# Patient Record
Sex: MALE | Race: WHITE
[De-identification: names, ages, dates, MRNs, and addresses within clinical notes are randomized per-mention and may not be internally consistent; named-entity substitution may affect disease eponyms.]

---

## 2019-02-15 ENCOUNTER — HOSPITAL ENCOUNTER (EMERGENCY)
Dept: HOSPITAL 25 - ED | Age: 19
Discharge: ANOTHER HOSPITAL | End: 2019-02-15
Payer: COMMERCIAL

## 2019-02-15 VITALS — DIASTOLIC BLOOD PRESSURE: 66 MMHG | SYSTOLIC BLOOD PRESSURE: 118 MMHG

## 2019-02-15 DIAGNOSIS — J45.990: Primary | ICD-10-CM

## 2019-02-15 LAB
ALBUMIN SERPL BCG-MCNC: 3.3 G/DL (ref 3.2–5.2)
ALBUMIN/GLOB SERPL: 0.7 {RATIO} (ref 1–3)
ALP SERPL-CCNC: 126 U/L (ref 34–104)
ALT SERPL W P-5'-P-CCNC: 271 U/L (ref 7–52)
ANION GAP SERPL CALC-SCNC: 9 MMOL/L (ref 2–11)
AST SERPL-CCNC: 120 U/L (ref 13–39)
BASOPHILS # BLD AUTO: 0.1 10^3/UL (ref 0–0.2)
BUN SERPL-MCNC: 11 MG/DL (ref 6–24)
BUN/CREAT SERPL: 17.5 (ref 8–20)
CALCIUM SERPL-MCNC: 8.6 MG/DL (ref 8.6–10.3)
CHLORIDE SERPL-SCNC: 96 MMOL/L (ref 101–111)
CK SERPL-CCNC: 28 U/L (ref 10–223)
EOSINOPHIL # BLD AUTO: 0.2 10^3/UL (ref 0–0.6)
FLUAV RNA SPEC QL NAA+PROBE: NEGATIVE
FLUBV RNA SPEC QL NAA+PROBE: NEGATIVE
GLOBULIN SER CALC-MCNC: 4.6 G/DL (ref 2–4)
GLUCOSE SERPL-MCNC: 115 MG/DL (ref 70–100)
HCO3 SERPL-SCNC: 27 MMOL/L (ref 22–32)
HCT VFR BLD AUTO: 37 % (ref 42–52)
HGB BLD-MCNC: 12.8 G/DL (ref 14–18)
INR PPP/BLD: 1.4 (ref 0.77–1.02)
LDH SERPL L TO P-CCNC: 277 U/L (ref 140–271)
LYMPHOCYTES # BLD AUTO: 1.3 10^3/UL (ref 1–4.8)
MCH RBC QN AUTO: 30 PG (ref 27–31)
MCHC RBC AUTO-ENTMCNC: 35 G/DL (ref 31–36)
MCV RBC AUTO: 85 FL (ref 80–94)
MONOCYTES # BLD AUTO: 1.4 10^3/UL (ref 0–0.8)
NEUTROPHILS # BLD AUTO: 9.9 10^3/UL (ref 1.5–7.7)
NRBC # BLD AUTO: 0 10^3/UL
NRBC BLD QL AUTO: 0
PLATELET # BLD AUTO: 329 10^3/UL (ref 150–450)
POTASSIUM SERPL-SCNC: 3.7 MMOL/L (ref 3.5–5)
PROT SERPL-MCNC: 7.9 G/DL (ref 6.4–8.9)
RBC # BLD AUTO: 4.34 10^6/UL (ref 4–5.4)
RBC UR QL AUTO: (no result)
SODIUM SERPL-SCNC: 132 MMOL/L (ref 135–145)
WBC # BLD AUTO: 12.8 10^3/UL (ref 3.5–10.8)
WBC UR QL AUTO: (no result)

## 2019-02-15 PROCEDURE — 76870 US EXAM SCROTUM: CPT

## 2019-02-15 PROCEDURE — 81003 URINALYSIS AUTO W/O SCOPE: CPT

## 2019-02-15 PROCEDURE — 36415 COLL VENOUS BLD VENIPUNCTURE: CPT

## 2019-02-15 PROCEDURE — 87086 URINE CULTURE/COLONY COUNT: CPT

## 2019-02-15 PROCEDURE — 81015 MICROSCOPIC EXAM OF URINE: CPT

## 2019-02-15 PROCEDURE — 71260 CT THORAX DX C+: CPT

## 2019-02-15 PROCEDURE — 83880 ASSAY OF NATRIURETIC PEPTIDE: CPT

## 2019-02-15 PROCEDURE — 86140 C-REACTIVE PROTEIN: CPT

## 2019-02-15 PROCEDURE — 96361 HYDRATE IV INFUSION ADD-ON: CPT

## 2019-02-15 PROCEDURE — 85652 RBC SED RATE AUTOMATED: CPT

## 2019-02-15 PROCEDURE — 85610 PROTHROMBIN TIME: CPT

## 2019-02-15 PROCEDURE — 87340 HEPATITIS B SURFACE AG IA: CPT

## 2019-02-15 PROCEDURE — 86308 HETEROPHILE ANTIBODY SCREEN: CPT

## 2019-02-15 PROCEDURE — 86664 EPSTEIN-BARR NUCLEAR ANTIGEN: CPT

## 2019-02-15 PROCEDURE — 71046 X-RAY EXAM CHEST 2 VIEWS: CPT

## 2019-02-15 PROCEDURE — 74177 CT ABD & PELVIS W/CONTRAST: CPT

## 2019-02-15 PROCEDURE — 93005 ELECTROCARDIOGRAM TRACING: CPT

## 2019-02-15 PROCEDURE — 80074 ACUTE HEPATITIS PANEL: CPT

## 2019-02-15 PROCEDURE — 82550 ASSAY OF CK (CPK): CPT

## 2019-02-15 PROCEDURE — 86706 HEP B SURFACE ANTIBODY: CPT

## 2019-02-15 PROCEDURE — 83615 LACTATE (LD) (LDH) ENZYME: CPT

## 2019-02-15 PROCEDURE — 85025 COMPLETE CBC W/AUTO DIFF WBC: CPT

## 2019-02-15 PROCEDURE — 80053 COMPREHEN METABOLIC PANEL: CPT

## 2019-02-15 PROCEDURE — 99285 EMERGENCY DEPT VISIT HI MDM: CPT

## 2019-02-15 PROCEDURE — 83605 ASSAY OF LACTIC ACID: CPT

## 2019-02-15 PROCEDURE — 86665 EPSTEIN-BARR CAPSID VCA: CPT

## 2019-02-15 PROCEDURE — 96374 THER/PROPH/DIAG INJ IV PUSH: CPT

## 2019-02-15 PROCEDURE — 87040 BLOOD CULTURE FOR BACTERIA: CPT

## 2019-02-15 NOTE — ED
Progress





- Progress Note


Progress Note: 





EVALUATED PATIENT AT REQUEST OF INNA LEAHY





 


HPI: A 19 y/o M presenting to ED with c/o RUE pain and other multi-system 

complaints onset 19. Pt was moving a dresser and awoke the next day (on the 

) with severe RUE pain. He went to [facility] [place], received pain meds and got 

XR. He saw a sport doctor who said he might have multiple small tears in the 

shoulder and it needed time to heal. He is noticing recent pedal edema. He 

slept with his feet elevated, but then woke up the next day with his hands 

swollen. Hes been sleeping in a recliner for 1.5 weeks. Two days ago, he 

noticed a rash on his bilat LE, as well as his testicles were discolored black. 

Associated sx: productive cough (yellow/green) onset a week ago and worsening 

last night, weakness, decreased appetite. Denies fever, recent weight gain, 

neck pain, vision changes, and scrotal pain. PCP was [facility], but 

is in-between providers at the moment. Mom and Aunt are present. 











PMHx: Exercise-induced asthma. Does not take daily medications. 





SHx: Denies IV drug use.


 


FHx: Father  young (leukemia, received graft which was attacked.)











PE: 





Constitutional: Well-developed, Well-nourished, Alert. (-) Distressed


Skin: Warm, Dry. There is petechiae on bilateral LE. 


HENT: Normocephalic; Atraumatic


Eyes: Conjunctiva normal


Neck: Musculoskeletal ROM normal neck. (-) JVD, (-) Stridor, (-) Tracheal 

deviation


Cardio: Rhythm regular, rate normal, Heart sounds normal; Intact distal pulses; 

The pedal pulses are 2+ and symmetric. Radial pulses are 2+ and symmetric. (-) 

Murmur


Pulmonary/Chest wall: There are crackles and wheezes in the R upper lung field. 

Orthopneic 


Abd: Soft, (-) epigastric tenderness, (-) Distension, (-) Guarding, (-) Rebound


Musculoskeletal: There is trace edema in bilateral ankles, wrists and hands. 


Lymph: (-) Cervical adenopathy


Neuro: Alert, Oriented x3


Psych: Mood and affect Normal


Genital: There is discoloration at midline of scrotum, which is not tender nor 

warm. 








Course/Dx





- Course


Course Of Treatment: DDx includes bacteremia, PNA, disseminated staph or strep, 

endocarditis, cardiac injury, CHF, nephrotic syndrome.





Discharge





- Discharge Plan


Referrals: 


Monico Del Cid MD [Primary Care Provider] - 





- Attestation Statements


Document Initiated by Scribe: Yes


Documenting Scribe: James Rodriguez


Provider For Whom Scribe is Documenting (Include Credential): Dr. Juventino Fernandez MD


Scribe Attestation: 


I, James Rodriguez, scribed for Dr. Juventino Fernandez MD on 02/15/19 at 1351.

## 2019-02-15 NOTE — ED
Complex/Multi-Sys Presentation





- HPI Summary


HPI Summary: 





Patient is a 18-year-old male presenting to the ED with grandmother.  Patient 

states a variety of symptoms have been happening over the past 2 weeks.  He 

endorses right arm pain which is severe in nature 2 weeks after moving a 

dresser.  He was seen at urgent care and given Flexeril following the injury. 

Decreased mobility in the arm since that time and has been seeing PT without 

relief of sxs.  Today, he continues to endorse inability to raise the R arm 

past 90 and this is still with discomfort.  No numbness/tingling.  He had 

subsequent follow-ups, and continues to unable to use his arm due to pain.  He 

has had labs drawn as of 2 days ago for his continuing arm pain as well as 

developing a rash and swelling to the bilateral lower extremities 2 days.  

This was completed as an outpatient and results were seen in the ED today. 

Reason for his visit today in the ED is: patient observed discoloration in his 

testicles bilaterally.  He denies any pain to the testicles.  He denies any 

temperature changes.  He also endorses a cough x 2 weeks with sputum 

production.  Denies fevers, sweats, chills.  Denies any abdominal pain, urinary 

symptoms, back pain.  Denies any headache, photophobia, neck stiffness.  He 

does endorse bilateral lower and upper extremity intermittent edema which is 

improved with elevation.  He states he has been sleeping in a recliner for the 

past 2 weeks due to his edema and coughing symptoms.  Personal hx of exercise-

induced asthma.  Family history (father) includes leukemia (father passed away 

from leukemia.)  Mother at bedside.  Patient is very anxious of sxs although is 

a good historian. 





- History Of Current Complaint


Chief Complaint: EDGeneral


Time Seen by Provider: 02/15/19 09:29


Hx Obtained From: Patient, Family/Caretaker, Medical Records


Onset/Duration: Sudden Onset, Lasting Weeks


Timing: Constant


Severity Currently: Moderate


Severity Initially: Moderate


Associated Signs And Symptoms: Positive: Edema - Bilateral upper and lower 

extremities, Recent Trauma - Right shoulder injury.  Negative: Confusion, 

Weakness, Headache, Wheezing, Nausea, Vomiting, Diarrhea, Decreased Oral Intake

, Fever, Immunocompromised





- Allergies/Home Medications


Allergies/Adverse Reactions: 


 Allergies











Allergy/AdvReac Type Severity Reaction Status Date / Time


 


DUST Allergy  SNEEZING, Uncoded 02/04/19 15:30





   CONGESTION  











Home Medications: 


 Home Medications





Albuterol HFA INHALER* [Ventolin HFA Inhaler*] 1 puff INH DAILY PRN 02/15/19 [

History Confirmed 02/15/19]


Cyclobenzaprine TAB* [Flexeril 10 MG TAB*] 10 mg PO BID PRN 02/15/19 [History 

Confirmed 02/15/19]











PMH/Surg Hx/FS Hx/Imm Hx


Previously Healthy: Yes


Endocrine/Hematology History: 


   Denies: Hx Diabetes


Cardiovascular History: 


   Denies: Hx Hypertension


Respiratory History: Reports: Hx Asthma


 History: 


   Denies: Hx Renal Disease


Infectious Disease History: No


Infectious Disease History: 


   Denies: Traveled Outside the [place] in Last 30 Days





- Family History


Known Family History: Positive: Non-Contributory





- Social History


Occupation: Unemployed


Lives: With Family


Alcohol Use: Occasionally


Hx Substance Use: No


Substance Use Type: Reports: None


Hx Tobacco Use: No


Smoking Status (MU): Never Smoked Tobacco





Review of Systems


Negative: Fever, Chills, Fatigue, Skin Diaphoresis


Negative: Diplopia, Drainage, Erythema


Negative: Dental Pain, Sore Throat


Negative: Palpitations, Chest Pain


Positive: Shortness Of Breath - intermittently, Cough


Negative: Abdominal Pain, Vomiting, Diarrhea, Nausea


Genitourinary: Negative


Positive: no symptoms reported, see HPI


Positive: Arthralgia, Myalgia


Positive: Other - bilateral lower extremity blanchable non-confluent rash 

representing petechia


Negative: Headache, Weakness, Paresthesia, Numbness


Psychological: Normal


All Other Systems Reviewed And Are Negative: Yes





Physical Exam


Triage Information Reviewed: Yes


Vital Signs On Initial Exam: 


 Initial Vitals











Temp Pulse Resp BP Pulse Ox


 


 96.5 F   115   18   121/86   95 


 


 02/15/19 09:23  02/15/19 09:23  02/15/19 09:23  02/15/19 09:23  02/15/19 09:23











Vital Signs Reviewed: Yes


Appearance: Positive: Well-Appearing, No Pain Distress


Skin: Positive: Skin Color Reflects Adequate Perfusion, Other - bilateral lower 

extremity blanchable non-confluent rash representing petechia


Head/Face: Positive: Normal Head/Face Inspection


Eyes: Positive: EOMI, MACO, Conjunctiva Clear


ENT: Positive: Pharynx normal.  Negative: TM bulging, TM dull, TM red


Neck: Positive: Supple, Nontender, No Lymphadenopathy


Respiratory/Lung Sounds: Positive: Clear to Auscultation, Breath Sounds Present


Cardiovascular: Positive: Normal, RRR, Pulses are Symmetrical in both Upper and 

Lower Extremities - No lower extremity edema bilaterally, however patient has 

had a history of such 2 weeks


Abdomen Description: Positive: Nontender, No Organomegaly, Soft


Bowel Sounds: Positive: Present


Musculoskeletal: Positive: Pain @ - Right anterior shoulder, patient unable to 

flex at the shoulder joint past 90


Neurological: Positive: Sensory/Motor Intact, Alert, Oriented to Person Place, 

Time, CN Intact II-III, Reflexes Intact, Speech Normal


Psychiatric: Positive: Normal, Affect/Mood Appropriate


AVPU Assessment: Alert





Diagnostics





- Vital Signs


 Vital Signs











  Temp Pulse Resp BP Pulse Ox


 


 02/15/19 15:37   106  24  130/71  97


 


 02/15/19 15:09   106  20  115/71  97


 


 02/15/19 15:00   101  12   98


 


 02/15/19 14:00   100  29   99


 


 02/15/19 13:06   106  25  123/78  96


 


 02/15/19 13:00   105  13   97


 


 02/15/19 12:37   103  18  124/79  98


 


 02/15/19 12:00   100  17   98


 


 02/15/19 11:36   102  33  128/77  98


 


 02/15/19 11:06   102  8  107/69  98


 


 02/15/19 11:00   102  16   97


 


 02/15/19 10:36   105  23  121/72  97


 


 02/15/19 10:08   108    96


 


 02/15/19 10:06   108   126/79  96


 


 02/15/19 09:23  96.5 F  115  18  121/86  95














- Laboratory


Lab Results: 


 Lab Results











  02/15/19 02/15/19 02/15/19 Range/Units





  10:13 10:15 10:15 


 


WBC   12.8 H   (3.5-10.8)  10^3/ul


 


RBC   4.34   (4.00-5.40)  10^6/ul


 


Hgb   12.8 L   (14.0-18.0)  g/dl


 


Hct   37 L   (42-52)  %


 


MCV   85   (80-94)  fL


 


MCH   30   (27-31)  pg


 


MCHC   35   (31-36)  g/dl


 


RDW   13   (10.5-15)  %


 


Plt Count   329   (150-450)  10^3/ul


 


MPV   7.9   (7.4-10.4)  fL


 


Neut % (Auto)   76.9   %


 


Lymph % (Auto)   10.5   %


 


Mono % (Auto)   10.8   %


 


Eos % (Auto)   1.4   %


 


Baso % (Auto)   0.4   %


 


Absolute Neuts (auto)   9.9 H   (1.5-7.7)  10^3/ul


 


Absolute Lymphs (auto)   1.3   (1.0-4.8)  10^3/ul


 


Absolute Monos (auto)   1.4 H   (0-0.8)  10^3/ul


 


Absolute Eos (auto)   0.2   (0-0.6)  10^3/ul


 


Absolute Basos (auto)   0.1   (0-0.2)  10^3/ul


 


Absolute Nucleated RBC   0   10^3/ul


 


Nucleated RBC %   0   


 


ESR   Pending   


 


INR (Anticoag Therapy)     (0.77-1.02)  


 


Sodium    132 L  (135-145)  mmol/L


 


Potassium    3.7  (3.5-5.0)  mmol/L


 


Chloride    96 L  (101-111)  mmol/L


 


Carbon Dioxide    27  (22-32)  mmol/L


 


Anion Gap    9  (2-11)  mmol/L


 


BUN    11  (6-24)  mg/dL


 


Creatinine    0.63 L  (0.67-1.17)  mg/dL


 


Est GFR ( Amer)    196.5  (>60)  


 


Est GFR (Non-Af Amer)    162.4  (>60)  


 


BUN/Creatinine Ratio    17.5  (8-20)  


 


Glucose    115 H  ()  mg/dL


 


Lactic Acid     (0.5-2.0)  mmol/L


 


Calcium    8.6  (8.6-10.3)  mg/dL


 


Total Bilirubin    0.60  (0.2-1.0)  mg/dL


 


AST    120 H  (13-39)  U/L


 


ALT    271 H  (7-52)  U/L


 


Alkaline Phosphatase    126 H  ()  U/L


 


Total Creatine Kinase    28  ()  U/L


 


C-Reactive Protein    179.01 H  (<8.01)  mg/L


 


B-Natriuretic Peptide     (<=100)  pg/mL


 


Total Protein    7.9  (6.4-8.9)  g/dL


 


Albumin    3.3  (3.2-5.2)  g/dL


 


Globulin    4.6 H  (2-4)  g/dL


 


Albumin/Globulin Ratio    0.7 L  (1-3)  


 


Hepatitis A IgM Ab  Nonreactive    (Nonreactive)  


 


Hepatitis B Antibody  Immune    (Immune)  


 


Hep Bs Antigen  Nonreactive    (Nonreactive)  


 


Hep Bs Antibody, Quant  638.07    (>12)  mIU/mL


 


Hep B Core IgM Ab  Nonreactive    (Nonreactive)  


 


Hepatitis C Antibody  Pending    


 


Hepatitis C Ab Index  Pending    


 


Monoscreen   Negative   (Negative)  














  02/15/19 02/15/19 02/15/19 Range/Units





  10:15 10:15 13:23 


 


WBC     (3.5-10.8)  10^3/ul


 


RBC     (4.00-5.40)  10^6/ul


 


Hgb     (14.0-18.0)  g/dl


 


Hct     (42-52)  %


 


MCV     (80-94)  fL


 


MCH     (27-31)  pg


 


MCHC     (31-36)  g/dl


 


RDW     (10.5-15)  %


 


Plt Count     (150-450)  10^3/ul


 


MPV     (7.4-10.4)  fL


 


Neut % (Auto)     %


 


Lymph % (Auto)     %


 


Mono % (Auto)     %


 


Eos % (Auto)     %


 


Baso % (Auto)     %


 


Absolute Neuts (auto)     (1.5-7.7)  10^3/ul


 


Absolute Lymphs (auto)     (1.0-4.8)  10^3/ul


 


Absolute Monos (auto)     (0-0.8)  10^3/ul


 


Absolute Eos (auto)     (0-0.6)  10^3/ul


 


Absolute Basos (auto)     (0-0.2)  10^3/ul


 


Absolute Nucleated RBC     10^3/ul


 


Nucleated RBC %     


 


ESR     


 


INR (Anticoag Therapy)  1.40 H    (0.77-1.02)  


 


Sodium     (135-145)  mmol/L


 


Potassium     (3.5-5.0)  mmol/L


 


Chloride     (101-111)  mmol/L


 


Carbon Dioxide     (22-32)  mmol/L


 


Anion Gap     (2-11)  mmol/L


 


BUN     (6-24)  mg/dL


 


Creatinine     (0.67-1.17)  mg/dL


 


Est GFR ( Amer)     (>60)  


 


Est GFR (Non-Af Amer)     (>60)  


 


BUN/Creatinine Ratio     (8-20)  


 


Glucose     ()  mg/dL


 


Lactic Acid    0.9  (0.5-2.0)  mmol/L


 


Calcium     (8.6-10.3)  mg/dL


 


Total Bilirubin     (0.2-1.0)  mg/dL


 


AST     (13-39)  U/L


 


ALT     (7-52)  U/L


 


Alkaline Phosphatase     ()  U/L


 


Total Creatine Kinase     ()  U/L


 


C-Reactive Protein     (<8.01)  mg/L


 


B-Natriuretic Peptide   15   (<=100)  pg/mL


 


Total Protein     (6.4-8.9)  g/dL


 


Albumin     (3.2-5.2)  g/dL


 


Globulin     (2-4)  g/dL


 


Albumin/Globulin Ratio     (1-3)  


 


Hepatitis A IgM Ab     (Nonreactive)  


 


Hepatitis B Antibody     (Immune)  


 


Hep Bs Antigen     (Nonreactive)  


 


Hep Bs Antibody, Quant     (>12)  mIU/mL


 


Hep B Core IgM Ab     (Nonreactive)  


 


Hepatitis C Antibody     


 


Hepatitis C Ab Index     


 


Monoscreen     (Negative)  











Result Diagrams: 


 02/15/19 10:15





 02/15/19 10:15


Lab Statement: Any lab studies that have been ordered have been reviewed, and 

results considered in the medical decision making process.





Complex Multi-Symp Course/Dx


Course Of Treatment: During the course of treatment, the patient is evaluated 

for a variety of symptoms.  He arrives today with the chief complaint of black 

testicles.  He states he awoke this morning had discoloration in his testicles

, however with no pain or swelling.  A testicular ultrasound was immediately 

ordered and showed no acute findings.  He also endorses a two-week history of 

right arm pain which is severe and aching in nature after moving a dresser.  X-

rays were completed 2 weeks ago with no acute findings.  He was given Flexeril 

without relief.  On physical examination, lungs are CTA, RRR, patient is 

nontoxic in appearing.  There is bilateral lower extremity petechia from the 

dorsum of the feet extending to just BTK.   exam reveals slight discoloration

, a darkening, to the mid line of the scrotum bilaterally.  There are no masses 

or epididymal tenderness.  Abdomen soft, nontender.  No CVA tenderness 

bilaterally.  Bowel sounds good throughout.  No chest pain on deep palpation of 

the chest wall or scapular region.  No lymphadenopathy bilaterally.  Vital 

signs are 96.5 and tachycardic at 115.  Blood pressure 121/86.  O2 sat at 95%.  

He takes no medications and denies any allergies.  This patient was tachycardic 

with petechia bilaterally as well as cough, labs and chest x-ray were ordered.  

Labs show leukocytosis with a left shift and transaminitis.  CRP elevated at 

179.  Compared to 2 days ago on labs, WBC decreased from 15.1 and CRP decreased 

from 252.  However, transaminase became more elevated.  Chest x-ray shows a 

right upper lobe pneumonia versus mass.  Recommended CT.  CT obtained which 

shows a 9.1 x 5.7 x 6.7 cm in size right upper lobe loculated fluid collection.

  IMPRESSION SHOWS 1.  LOCULATED FLUID COLLECTION WITH SMALL AMOUNT OF GAS 

DENSITY IN THE RIGHT UPPER LOBE.  GIVEN THE CHANGE FROM FEBRUARY 4, 2019 CHEST 

RADIOGRAPH, THIS LIKELY REPRESENTS A PULMONARY ABSCESS.  THERE IS ASSOCIATED 

MILD RIGHT HILAR LYMPHADENOPATHY AND A SMALL RIGHT PLEURAL EFFUSION.  Zosyn and 

vanco as well as 2.5L NS given while in the ED.  Hep panel and blood cultures 

pending.  Will send to [facility] when results are in.  Urine negative for 

infection, but has +2 RBCs.  Influenza negative.  This case was discussed with 

our hospitalist team and Dr. Fernandez, emergency room attending was involved 

throughout the care.  [facility] [place] or CT surgery to 

take this patient for [place] [facility].  It is recommended to me by my 

attending, the hospitalist team and pulmonology this patient be transferred to 

a higher level of care for medical management and CT surg and pulmonology 

availability.  [facility] accepts ED to ED at 5:45pm.  Dr. Henderson accepts 

patient to the ED.  Patient requesting to eat.  As there is likely no chance 

stuart of invasive treatment, patient is allowed to eat and drink.  He continues 

to be tachycardia throughout his stay.  While this appears to be a pulmonary 

abscess, the family hx of leukemia is concerning for mass.   Our center called 

[facility] as well prior to [facility], but no bed was available as inpatient/unable 

to take in ED d/t the need of inpatient status.  Patient is stable on discharge 

and [facility] ambulance will take emergently on ALS without medications/fluids.  

DDx includes bacteremia, PNA, disseminated staph or strep, endocarditis, 

cardiac injury, CHF, nephrotic syndrome, mass.





- Diagnoses


Provider Diagnoses: 


 Pulmonary abscess, Petechiae








- Physician Notifications


Discussed Care Of Patient With: Juventino Jim - Attending involved in this 

patients care during visit


Instructed by Provider To: Transfer


Admit/Transition Orders Completed By ED Provider: No - no tx orders needed


Reason For Transfer: Specialty or service not available at [facility] - Services not 

available at [facility] at this time include: pulmonology, CT surgery, Patient not 

appropriate for [facility].





- Critical Care Time


Critical Care Time:  min





Discharge





- Sign-Out/Discharge


Documenting (check all that apply): Patient Departure


Patient Received Moderate/Deep Sedation with Procedure: No





- Discharge Plan


Condition: Fair


Disposition: TRANS HIGHER LVL OF CARE FAC


Referrals: 


Monico Del Cid MD [Primary Care Provider] - 





- Billing Disposition and Condition


Condition: FAIR


Disposition: Trans Higher Lvl of Care Fac